# Patient Record
Sex: MALE | Race: WHITE | NOT HISPANIC OR LATINO | URBAN - METROPOLITAN AREA
[De-identification: names, ages, dates, MRNs, and addresses within clinical notes are randomized per-mention and may not be internally consistent; named-entity substitution may affect disease eponyms.]

---

## 2019-09-16 ENCOUNTER — EMERGENCY (EMERGENCY)
Facility: HOSPITAL | Age: 59
LOS: 0 days | Discharge: HOME | End: 2019-09-16
Attending: EMERGENCY MEDICINE | Admitting: EMERGENCY MEDICINE
Payer: COMMERCIAL

## 2019-09-16 VITALS
DIASTOLIC BLOOD PRESSURE: 82 MMHG | TEMPERATURE: 99 F | SYSTOLIC BLOOD PRESSURE: 131 MMHG | HEART RATE: 87 BPM | OXYGEN SATURATION: 96 % | RESPIRATION RATE: 18 BRPM

## 2019-09-16 VITALS — WEIGHT: 259.93 LBS | HEIGHT: 68 IN

## 2019-09-16 DIAGNOSIS — S61.210A LACERATION WITHOUT FOREIGN BODY OF RIGHT INDEX FINGER WITHOUT DAMAGE TO NAIL, INITIAL ENCOUNTER: ICD-10-CM

## 2019-09-16 DIAGNOSIS — Z88.0 ALLERGY STATUS TO PENICILLIN: ICD-10-CM

## 2019-09-16 DIAGNOSIS — Z23 ENCOUNTER FOR IMMUNIZATION: ICD-10-CM
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
## 2019-09-16 PROCEDURE — 99283 EMERGENCY DEPT VISIT LOW MDM: CPT

## 2019-09-16 RX ORDER — TETANUS TOXOID, REDUCED DIPHTHERIA TOXOID AND ACELLULAR PERTUSSIS VACCINE, ADSORBED 5; 2.5; 8; 8; 2.5 [IU]/.5ML; [IU]/.5ML; UG/.5ML; UG/.5ML; UG/.5ML
0.5 SUSPENSION INTRAMUSCULAR ONCE
Refills: 0 | Status: COMPLETED | OUTPATIENT
Start: 2019-09-16 | End: 2019-09-16

## 2019-09-16 RX ADMIN — TETANUS TOXOID, REDUCED DIPHTHERIA TOXOID AND ACELLULAR PERTUSSIS VACCINE, ADSORBED 0.5 MILLILITER(S): 5; 2.5; 8; 8; 2.5 SUSPENSION INTRAMUSCULAR at 12:48

## 2019-09-16 NOTE — ED PROVIDER NOTE - CARE PROVIDER_API CALL
your, primary doctor  for any new for worsening symtptoms  Phone: (   )    -  Fax: (   )    -  Follow Up Time:

## 2019-09-16 NOTE — ED PROVIDER NOTE - PHYSICAL EXAMINATION
--EXAM--  VITAL SIGNS: I have reviewed vs documented at present.  CONSTITUTIONAL: Well-developed; well-nourished; in no acute distress.   SKIN: Warm and dry, no acute rash.   right 2nd finger positive skin avulsion positive bleeding   CARD: S1, S2, Regular rate and rhythm.   RESP: No wheezes, rales or rhonchi. --EXAM--  VITAL SIGNS: I have reviewed vs documented at present.  CONSTITUTIONAL: Well-developed; well-nourished; in no acute distress.   SKIN: Warm and dry, no acute rash.   right 2nd finger positive skin avulsion positive bleeding full rom .   CARD: S1, S2, Regular rate and rhythm.   RESP: No wheezes, rales or rhonchi.

## 2019-09-16 NOTE — ED PROVIDER NOTE - CONDITION AT DISCHARGE:
[id number] [place] [address] with [facility]  4/12/2017    2:12 PM    Cc:  Episodes of dizziness    2005 had first spell of vertigo.   She woke up one morning who felt staggering gait, room spinning and left eye kept bryan hyperlipidemia 9/20/2010   • Asthma    • Diverticulosis    • Cholelithiasis          Past Surgical History    COLONOSCOPY & POLYPECTOMY      CHOLECYSTECTOMY      OTHER SURGICAL HISTORY      Comment orthopedic surgery    LIGATION HEMORRHOID W/US      OTHER the evening as directed., Disp: 17 pen, Rfl: 3  •  NOVOLOG FLEXPEN 100 UNIT/ML Subcutaneous Solution Pen-injector, INJECT 14 TO 17 UNITS AT BREAKFAST, 14 TO 17 UNITS AT LUNCH, 12 TO 17 UNITS AT DINNER AND 23 UNITS FOR SNACKS, Disp: 75 mL, Rfl: 6  •  ONETOU dehiscence      Physical Therapy Referral - External  CT TEMPORAL BONES (CPT=70480)  MRA BRAIN (NRX=91650)      Defer discussion of treatment pending above findings      Deitra Gosselin MD  Vascular & General Neurology  Director, Multiple Sclerosis Program Improved

## 2019-09-16 NOTE — ED PROVIDER NOTE - PROVIDER TOKENS
FREE:[LAST:[your],FIRST:[primary doctor],PHONE:[(   )    -],FAX:[(   )    -],ADDRESS:[for any new for worsening symtptoms]]

## 2019-09-16 NOTE — ED PROVIDER NOTE - CLINICAL SUMMARY MEDICAL DECISION MAKING FREE TEXT BOX
pt with avulsion to end of finger using slicer.  no other complaints.  no other injuries.  no numbness, no weakness.  pt given tetanus.  Gel foam applied.  hemostasis achieved.  motor/sensation intact.  pt dc with outpatient follow up.  pt given gel foam dc instructions

## 2019-09-16 NOTE — ED PROVIDER NOTE - NSFOLLOWUPINSTRUCTIONS_ED_ALL_ED_FT
Laceration    A laceration is a cut that goes through all of the layers of the skin and into the tissue that is right under the skin. Some lacerations heal on their own. . Proper laceration care minimizes the risk of infection and helps the laceration to heal better.  gelfoam was placed to stop bleeding     SEEK IMMEDIATE MEDICAL CARE IF YOU HAVE ANY OF THE FOLLOWING SYMPTOMS: swelling around the wound, worsening pain, drainage from the wound, red streaking going away from your wound, inability to move finger or toe near the laceration, or discoloration of skin near the laceration.

## 2019-09-16 NOTE — ED PROVIDER NOTE - OBJECTIVE STATEMENT
this is 58 yo male presents to ed for evaluation of laceration of right 2nd finger. patient states he was using food slicer .

## 2019-09-16 NOTE — ED PROVIDER NOTE - PATIENT PORTAL LINK FT
You can access the FollowMyHealth Patient Portal offered by [facility] by registering at the following website: [email or web address]. By joining [facility]’s FollowMyHealth portal, you will also be able to view your health information using other applications (apps) compatible with our system.

## 2019-09-16 NOTE — ED PROVIDER NOTE - ATTENDING CONTRIBUTION TO CARE
58 yo m presents with right 2nd finger skin avulsion.  pt accidentally sliced the palmar distal aspect of fingerpad.  no other complaints.  no other injuries.  awake, alert.  RUE: 2nd digit with skin avulsion, mild bleeding.  rom of finger intact, flexion/extension intact.  neurovascular intact.    p: tetanus.  gel foam applied to achieve hemostasis and dressing placed.

## 2019-09-16 NOTE — ED PROVIDER NOTE - NS ED ROS FT
Review of Systems:  	•	CONSTITUTIONAL - no fever, no diaphoresis, no chills  	•	SKIN - right 2nd finger avulsion   	•	HEMATOLOGIC - positive bleeding to finger   	•  	•	RESPIRATORY - no shortness of breath, no cough  	•	CARDIAC - no chest pain, no palpitations

## 2020-03-14 ENCOUNTER — INPATIENT (INPATIENT)
Facility: HOSPITAL | Age: 60
LOS: 0 days | Discharge: HOME | End: 2020-03-15
Attending: PLASTIC SURGERY | Admitting: PLASTIC SURGERY
Payer: COMMERCIAL

## 2020-03-14 VITALS
HEART RATE: 76 BPM | TEMPERATURE: 97 F | RESPIRATION RATE: 18 BRPM | HEIGHT: 68 IN | DIASTOLIC BLOOD PRESSURE: 85 MMHG | SYSTOLIC BLOOD PRESSURE: 127 MMHG | WEIGHT: 265 LBS | OXYGEN SATURATION: 99 %

## 2020-03-14 DIAGNOSIS — E11.69 TYPE 2 DIABETES MELLITUS WITH OTHER SPECIFIED COMPLICATION: ICD-10-CM

## 2020-03-14 DIAGNOSIS — Z98.890 OTHER SPECIFIED POSTPROCEDURAL STATES: Chronic | ICD-10-CM

## 2020-03-14 DIAGNOSIS — S61.318S: ICD-10-CM

## 2020-03-14 LAB
ALBUMIN SERPL ELPH-MCNC: 4.1 G/DL — SIGNIFICANT CHANGE UP (ref 3.5–5.2)
ALP SERPL-CCNC: 47 U/L — SIGNIFICANT CHANGE UP (ref 30–115)
ALT FLD-CCNC: 16 U/L — SIGNIFICANT CHANGE UP (ref 0–41)
ANION GAP SERPL CALC-SCNC: 13 MMOL/L — SIGNIFICANT CHANGE UP (ref 7–14)
APTT BLD: 25.3 SEC — LOW (ref 27–39.2)
AST SERPL-CCNC: 33 U/L — SIGNIFICANT CHANGE UP (ref 0–41)
BASOPHILS # BLD AUTO: 0.05 K/UL — SIGNIFICANT CHANGE UP (ref 0–0.2)
BASOPHILS NFR BLD AUTO: 0.5 % — SIGNIFICANT CHANGE UP (ref 0–1)
BILIRUB SERPL-MCNC: 0.3 MG/DL — SIGNIFICANT CHANGE UP (ref 0.2–1.2)
BUN SERPL-MCNC: 16 MG/DL — SIGNIFICANT CHANGE UP (ref 10–20)
CALCIUM SERPL-MCNC: 9.1 MG/DL — SIGNIFICANT CHANGE UP (ref 8.5–10.1)
CHLORIDE SERPL-SCNC: 97 MMOL/L — LOW (ref 98–110)
CO2 SERPL-SCNC: 21 MMOL/L — SIGNIFICANT CHANGE UP (ref 17–32)
CREAT SERPL-MCNC: 0.9 MG/DL — SIGNIFICANT CHANGE UP (ref 0.7–1.5)
EOSINOPHIL # BLD AUTO: 0.42 K/UL — SIGNIFICANT CHANGE UP (ref 0–0.7)
EOSINOPHIL NFR BLD AUTO: 3.9 % — SIGNIFICANT CHANGE UP (ref 0–8)
GLUCOSE BLDC GLUCOMTR-MCNC: 307 MG/DL — HIGH (ref 70–99)
GLUCOSE SERPL-MCNC: 347 MG/DL — HIGH (ref 70–99)
HCT VFR BLD CALC: 39.4 % — LOW (ref 42–52)
HGB BLD-MCNC: 12.5 G/DL — LOW (ref 14–18)
IMM GRANULOCYTES NFR BLD AUTO: 0.7 % — HIGH (ref 0.1–0.3)
INR BLD: 0.94 RATIO — SIGNIFICANT CHANGE UP (ref 0.65–1.3)
LYMPHOCYTES # BLD AUTO: 28 % — SIGNIFICANT CHANGE UP (ref 20.5–51.1)
LYMPHOCYTES # BLD AUTO: 3 K/UL — SIGNIFICANT CHANGE UP (ref 1.2–3.4)
MCHC RBC-ENTMCNC: 23.9 PG — LOW (ref 27–31)
MCHC RBC-ENTMCNC: 31.7 G/DL — LOW (ref 32–37)
MCV RBC AUTO: 75.3 FL — LOW (ref 80–94)
MONOCYTES # BLD AUTO: 0.8 K/UL — HIGH (ref 0.1–0.6)
MONOCYTES NFR BLD AUTO: 7.5 % — SIGNIFICANT CHANGE UP (ref 1.7–9.3)
NEUTROPHILS # BLD AUTO: 6.38 K/UL — SIGNIFICANT CHANGE UP (ref 1.4–6.5)
NEUTROPHILS NFR BLD AUTO: 59.4 % — SIGNIFICANT CHANGE UP (ref 42.2–75.2)
NRBC # BLD: 0 /100 WBCS — SIGNIFICANT CHANGE UP (ref 0–0)
PLATELET # BLD AUTO: 240 K/UL — SIGNIFICANT CHANGE UP (ref 130–400)
POTASSIUM SERPL-MCNC: 5.5 MMOL/L — HIGH (ref 3.5–5)
POTASSIUM SERPL-SCNC: 5.5 MMOL/L — HIGH (ref 3.5–5)
PROT SERPL-MCNC: 6.4 G/DL — SIGNIFICANT CHANGE UP (ref 6–8)
PROTHROM AB SERPL-ACNC: 10.8 SEC — SIGNIFICANT CHANGE UP (ref 9.95–12.87)
RBC # BLD: 5.23 M/UL — SIGNIFICANT CHANGE UP (ref 4.7–6.1)
RBC # FLD: 14.6 % — HIGH (ref 11.5–14.5)
SODIUM SERPL-SCNC: 131 MMOL/L — LOW (ref 135–146)
WBC # BLD: 10.72 K/UL — SIGNIFICANT CHANGE UP (ref 4.8–10.8)
WBC # FLD AUTO: 10.72 K/UL — SIGNIFICANT CHANGE UP (ref 4.8–10.8)

## 2020-03-14 PROCEDURE — 99285 EMERGENCY DEPT VISIT HI MDM: CPT

## 2020-03-14 PROCEDURE — 73130 X-RAY EXAM OF HAND: CPT | Mod: 26,RT

## 2020-03-14 RX ORDER — DEXTROSE 50 % IN WATER 50 %
12.5 SYRINGE (ML) INTRAVENOUS ONCE
Refills: 0 | Status: DISCONTINUED | OUTPATIENT
Start: 2020-03-14 | End: 2020-03-15

## 2020-03-14 RX ORDER — DEXTROSE 50 % IN WATER 50 %
25 SYRINGE (ML) INTRAVENOUS ONCE
Refills: 0 | Status: DISCONTINUED | OUTPATIENT
Start: 2020-03-14 | End: 2020-03-15

## 2020-03-14 RX ORDER — DEXTROSE 50 % IN WATER 50 %
15 SYRINGE (ML) INTRAVENOUS ONCE
Refills: 0 | Status: DISCONTINUED | OUTPATIENT
Start: 2020-03-14 | End: 2020-03-15

## 2020-03-14 RX ORDER — GENTAMICIN SULFATE 40 MG/ML
80 VIAL (ML) INJECTION EVERY 8 HOURS
Refills: 0 | Status: DISCONTINUED | OUTPATIENT
Start: 2020-03-14 | End: 2020-03-15

## 2020-03-14 RX ORDER — SITAGLIPTIN AND METFORMIN HYDROCHLORIDE 500; 50 MG/1; MG/1
1 TABLET, FILM COATED ORAL
Qty: 0 | Refills: 0 | DISCHARGE

## 2020-03-14 RX ORDER — MORPHINE SULFATE 50 MG/1
4 CAPSULE, EXTENDED RELEASE ORAL
Refills: 0 | Status: DISCONTINUED | OUTPATIENT
Start: 2020-03-14 | End: 2020-03-15

## 2020-03-14 RX ORDER — SODIUM CHLORIDE 9 MG/ML
1000 INJECTION INTRAMUSCULAR; INTRAVENOUS; SUBCUTANEOUS
Refills: 0 | Status: DISCONTINUED | OUTPATIENT
Start: 2020-03-14 | End: 2020-03-15

## 2020-03-14 RX ORDER — CEFAZOLIN SODIUM 1 G
2000 VIAL (EA) INJECTION ONCE
Refills: 0 | Status: COMPLETED | OUTPATIENT
Start: 2020-03-14 | End: 2020-03-14

## 2020-03-14 RX ORDER — INSULIN LISPRO 100/ML
VIAL (ML) SUBCUTANEOUS
Refills: 0 | Status: DISCONTINUED | OUTPATIENT
Start: 2020-03-14 | End: 2020-03-15

## 2020-03-14 RX ORDER — VANCOMYCIN HCL 1 G
1000 VIAL (EA) INTRAVENOUS EVERY 12 HOURS
Refills: 0 | Status: COMPLETED | OUTPATIENT
Start: 2020-03-14 | End: 2020-03-15

## 2020-03-14 RX ORDER — GENTAMICIN SULFATE 40 MG/ML
80 VIAL (ML) INJECTION ONCE
Refills: 0 | Status: COMPLETED | OUTPATIENT
Start: 2020-03-14 | End: 2020-03-14

## 2020-03-14 RX ORDER — SODIUM CHLORIDE 9 MG/ML
1000 INJECTION, SOLUTION INTRAVENOUS ONCE
Refills: 0 | Status: COMPLETED | OUTPATIENT
Start: 2020-03-14 | End: 2020-03-14

## 2020-03-14 RX ORDER — GLUCAGON INJECTION, SOLUTION 0.5 MG/.1ML
1 INJECTION, SOLUTION SUBCUTANEOUS ONCE
Refills: 0 | Status: DISCONTINUED | OUTPATIENT
Start: 2020-03-14 | End: 2020-03-15

## 2020-03-14 RX ORDER — INSULIN HUMAN 100 [IU]/ML
6 INJECTION, SOLUTION SUBCUTANEOUS ONCE
Refills: 0 | Status: DISCONTINUED | OUTPATIENT
Start: 2020-03-14 | End: 2020-03-15

## 2020-03-14 RX ORDER — ACETAMINOPHEN 500 MG
650 TABLET ORAL EVERY 6 HOURS
Refills: 0 | Status: DISCONTINUED | OUTPATIENT
Start: 2020-03-14 | End: 2020-03-15

## 2020-03-14 RX ORDER — INFLUENZA VIRUS VACCINE 15; 15; 15; 15 UG/.5ML; UG/.5ML; UG/.5ML; UG/.5ML
0.5 SUSPENSION INTRAMUSCULAR ONCE
Refills: 0 | Status: COMPLETED | OUTPATIENT
Start: 2020-03-14 | End: 2020-03-14

## 2020-03-14 RX ORDER — CHLORHEXIDINE GLUCONATE 213 G/1000ML
1 SOLUTION TOPICAL
Refills: 0 | Status: DISCONTINUED | OUTPATIENT
Start: 2020-03-14 | End: 2020-03-15

## 2020-03-14 RX ORDER — SODIUM CHLORIDE 9 MG/ML
1000 INJECTION, SOLUTION INTRAVENOUS
Refills: 0 | Status: DISCONTINUED | OUTPATIENT
Start: 2020-03-14 | End: 2020-03-15

## 2020-03-14 RX ORDER — MORPHINE SULFATE 50 MG/1
2 CAPSULE, EXTENDED RELEASE ORAL
Refills: 0 | Status: DISCONTINUED | OUTPATIENT
Start: 2020-03-14 | End: 2020-03-15

## 2020-03-14 RX ADMIN — Medication 250 MILLIGRAM(S): at 21:46

## 2020-03-14 RX ADMIN — Medication 8: at 21:45

## 2020-03-14 RX ADMIN — Medication 160 MILLIGRAM(S): at 19:05

## 2020-03-14 RX ADMIN — Medication 100 MILLIGRAM(S): at 18:48

## 2020-03-14 RX ADMIN — SODIUM CHLORIDE 1000 MILLILITER(S): 9 INJECTION, SOLUTION INTRAVENOUS at 18:47

## 2020-03-14 NOTE — H&P ADULT - NSHPLABSRESULTS_GEN_ALL_CORE
12.5   10.72 )-----------( 240      ( 14 Mar 2020 18:17 )             39.4       03-14    131<L>  |  97<L>  |  16  ----------------------------<  347<H>  5.5<H>   |  21  |  0.9    Ca    9.1      14 Mar 2020 18:17    TPro  6.4  /  Alb  4.1  /  TBili  0.3  /  DBili  x   /  AST  33  /  ALT  16  /  AlkPhos  47  03-14              PT/INR - ( 14 Mar 2020 18:17 )   PT: 10.80 sec;   INR: 0.94 ratio      PTT - ( 14 Mar 2020 18:17 )  PTT:25.3 sec        POCT Blood Glucose.: 339 mg/dL (14 Mar 2020 18:22)        Xray right hand/fingers:  Ordered by ER and not yet completed.

## 2020-03-14 NOTE — ED PROVIDER NOTE - PHYSICAL EXAMINATION
CONST: NAD  EYES: Sclera and conjunctiva clear.   ENT: No nasal discharge. Oropharynx normal appearing, no erythema or exudates. No abscess or swelling. Uvula midline.   NECK: Non-tender, no meningeal signs. normal ROM. supple   CARD: S1 S2; No jvd  RESP: Equal BS B/L, No wheezes, rhonchi or rales. No distress  GI: Soft, non-tender, non-distended. no cva tenderness. normal BS  MS: Normal ROM in all extremities.   SKIN: Complex laceration to R 2nd and 3rd finger  NEURO: A&Ox4, No focal deficits.

## 2020-03-14 NOTE — PATIENT PROFILE ADULT - DO YOU FEEL UNSAFE AT SCHOOL?
GROUP THERAPY PROGRESS NOTE    Jagruti Harding is participating in [place]. Group time: 15 minutes    Personal goal for participation: Community Announcement    Goal orientation: community    Group therapy participation: active    Therapeutic interventions reviewed and discussed: Discussed unit schedule, visiting hours, housekeeping/maintenance issues, addressed program concerns and unit guidelines. Impression of participation: Pt had issues at snack time this evening;did not appreciate the fact that this writer had to establish order. Pt was informed to step back while this writer was preparing snacks. Informed Pt that we have to follow structure in order to avoid chaos, confusion, and to ensure everyone gets their fair share of snacks. Pt did not agree and decided to leave the conversation and go to his room. not applicable

## 2020-03-14 NOTE — H&P ADULT - HISTORY OF PRESENT ILLNESS
Patient is a 60 y/o male who presents to the ER with complex laceration of right 2nd & 3rd digits that he sustained while working on his table saw earlier today.  Patient reports he had moderate bleeding that slowed when applied pressure.    Patient reports moderate pain and decreased ROM but denies paresthesias or numbness to fingers.    Patient denies other injuries or LOC.

## 2020-03-14 NOTE — ED PROVIDER NOTE - SECONDARY DIAGNOSIS.
Laceration of right middle finger without foreign body with damage to nail, initial encounter Type 2 diabetes mellitus with other specified complication, with long-term current use of insulin

## 2020-03-14 NOTE — ED PROVIDER NOTE - ATTENDING CONTRIBUTION TO CARE
I personally evaluated patient. I agree with the findings and plan with all documentation on chart except as documented  in my note.    60 y/o male who presents to the ER with complex laceration of right 2nd & 3rd digits that he sustained while working on his table saw earlier today.  Patient reports he had moderate bleeding that slowed when applied pressure.  Patient has a 3rd finger deformity with a very complex laceration to his dominant right hand.    Patient reports moderate pain and decreased ROM but denies paresthesias or numbness to fingers.    Patient denies other injuries or LOC.      Tdap up-to-date.  Dr. Pulliam immediately consulted and was sent pictures (at permission of patient).  Patient given dual IV antibiotics and labs sent.  Patient given fluid and insulin for high sugar. X-rays done.  Will admit to Dr. Pulliam for operative fixation of this laceration. Patient and family spoken to in detail about results and plan of care.  All questions addressed.  Results of ED work up discussed and patient/family given a copy of the results. They are aware patient requires admission for further treatment and work up. I personally evaluated patient. I agree with the findings and plan with all documentation on chart except as documented  in my note.    60 y/o male who presents to the ER with complex laceration of right 2nd & 3rd digits that he sustained while working on his table saw earlier today.  Patient reports he had moderate bleeding that slowed when applied pressure.  Patient has a 3rd finger deformity with a very complex laceration to his dominant right hand.    Patient reports moderate pain and decreased ROM but denies paresthesias or numbness to fingers.    Patient denies other injuries or LOC.  Hyperglycemia and fluids given and insulin.    Tdap up-to-date.  Dr. Pulliam immediately consulted and was sent pictures (at permission of patient).  Patient given dual IV antibiotics and labs sent.  Patient given fluid and insulin for high sugar. X-rays done.  Will admit to Dr. Pulliam for operative fixation of this laceration. Patient and family spoken to in detail about results and plan of care.  All questions addressed.  Results of ED work up discussed and patient/family given a copy of the results. They are aware patient requires admission for further treatment and work up.

## 2020-03-14 NOTE — PATIENT PROFILE ADULT - PRIMARY SOURCE OF SUPPORT/COMFORT
Impression;  1. Dysphagia- 2/2 food impaction which has likely passed  2. Afib?- just on ASA  3. HTn    Plan:  1. Recommend barium swallow this AM. Patient wishes to avoid anesthesia and endoscopy if it shows no evidence of obstruction  2. keep NPO until barium swallow   3. Cardiology clearance in case EGD is warranted Impression;  1. Dysphagia- 2/2 food impaction which has likely passed; differential also includes acid reflux related dysphagia, strictures, rings, neoplasms, less likely EoE  2. Afib?- just on ASA  3. HTN    Plan:  1. Recommend barium swallow this AM. Patient wishes to avoid anesthesia and endoscopy if it shows no evidence of obstruction  2. keep NPO until barium swallow   3. Cardiology clearance in case EGD is warranted  4. PPI daily spouse

## 2020-03-14 NOTE — H&P ADULT - PROBLEM SELECTOR PLAN 1
.  - Case d/w Dr. Pulliam and states to admit patient to his service for exploration of right 2nd & 3rd digits and possible repair of tendon/ligaments and closure of lacerations tomorrow 3/15/2020.   - Follow up right hand Xray. (Ordered by ER).  - Dr. Pulliam wants Ivabx with Ancef 1 gram IV [id number] and Gent. 80 mg IV  [id number].  (First doses already given in ER).   - Patient will be NPO after midnight for surgery in am.   - Dr. Pulliam's plan discussed with ER attending.  - Also, plan d/w patient and he agrees with Dr. Pulliam's plan. .  - Case d/w Dr. Pulliam and states to admit patient to his service for exploration of right 2nd & 3rd digits and possible repair of tendon/ligaments and closure of lacerations tomorrow 3/15/2020.   - Follow up right hand Xray. (Ordered by ER).  - Dr. Pulliam wants Ivabx with Ancef [id number] (But Pt is PCN allergic so will order Vanco 1 gram q12 hours) and Gent. 80 mg IV  [id number].  (First doses already given in ER).   - Patient will be NPO after midnight for surgery in am.   - Dr. Pulliam's plan discussed with ER attending.  - Also, plan d/w patient and he agrees with Dr. Pulliam's plan.

## 2020-03-14 NOTE — ED PROVIDER NOTE - CARE PLAN
Principal Discharge DX:	Finger laceration Principal Discharge DX:	Open fracture of multiple sites of phalanx or phalanges of hand, initial encounter  Secondary Diagnosis:	Laceration of right middle finger without foreign body with damage to nail, initial encounter  Secondary Diagnosis:	Type 2 diabetes mellitus with other specified complication, with long-term current use of insulin

## 2020-03-14 NOTE — ED PROVIDER NOTE - OBJECTIVE STATEMENT
59y M pmh dm presents for eval of R hand injury. Pt states his R hand was cut by a table saw this afternoon, now presents with severe sharp pain to R 2nd and 3rd finger, aggravated with rom, no relieving factors.

## 2020-03-14 NOTE — H&P ADULT - PROBLEM SELECTOR PLAN 2
.  - Dr. Pulliam states to hold routine diabetes medications and place on a Sliding scale of Insulin.  - NPO after midnight.

## 2020-03-14 NOTE — ED PROVIDER NOTE - CLINICAL SUMMARY MEDICAL DECISION MAKING FREE TEXT BOX
60 y/o male who presents to the ER with complex laceration of right 2nd & 3rd digits that he sustained while working on his table saw earlier today.  Patient reports he had moderate bleeding that slowed when applied pressure.  Patient has a 3rd finger deformity with a very complex laceration to his dominant right hand.    Patient reports moderate pain and decreased ROM but denies paresthesias or numbness to fingers.    Patient denies other injuries or LOC.      Tdap up-to-date.  Dr. Pulliam immediately consulted and was sent pictures (at permission of patient).  Patient given dual IV antibiotics and labs sent.  Patient given fluid and insulin for high sugar. X-rays done.  Will admit to Dr. Pulliam for operative fixation of this laceration. Patient and family spoken to in detail about results and plan of care.  All questions addressed.  Results of ED work up discussed and patient/family given a copy of the results. They are aware patient requires admission for further treatment and work up.

## 2020-03-14 NOTE — H&P ADULT - NSHPPHYSICALEXAM_GEN_ALL_CORE
PHYSICAL EXAM:    General:  WD, obese male, conversant in NAD.    Eyes: PERRLA, EOM intact.    Neck: no tenderness, no JVD.    Back: no spinal tenderness.     Respiratory: CTA B/L.    Cardiovascular:  S1 & S2, RRR.    Gastrointestinal: + BS, soft, obese, protuberant, no distention, non-tender, no rebound or guarding or peritoneal signs.    Extremities:   Right hand:  Dressing in place to right 2nd and 3rd digits with sanguinous discharge noted on gauze.  Partial fingertip amputation noted to 2nd digit with oozing and bleeding noted (not arterial).  Longitudinal laceration to lateral 3rd digit with oozing & bleeding noted (not arterial).  Patient can slightly wiggle fingers with pain on movement.  Decreased sensation noted to 2nd & 3rd digits.    Remaining extremities with Free painless ROM, no C/C/E, no calf tenderness.

## 2020-03-14 NOTE — ED PROVIDER NOTE - PRINCIPAL DIAGNOSIS
Finger laceration Open fracture of multiple sites of phalanx or phalanges of hand, initial encounter

## 2020-03-14 NOTE — H&P ADULT - ASSESSMENT
58 y/o male with complex laceration of right 2nd & 3rd digits that he sustained while working on his table saw earlier today.

## 2020-03-14 NOTE — H&P ADULT - PROBLEM SELECTOR PROBLEM 1
Laceration of middle finger with damage to nail, foreign body presence unspecified, unspecified laterality, sequela

## 2020-03-14 NOTE — ED PROVIDER NOTE - NS ED ROS FT
Constitutional: (-) fever  Eyes/ENT: (-) blurry vision, (-) epistaxis  Cardiovascular: (-) chest pain, (-) syncope  Respiratory: (-) cough, (-) shortness of breath  Gastrointestinal: (-) vomiting, (-) diarrhea  : (-) hematuria  Musculoskeletal: (+) joint pain, (-) neck pain, (-) back pain  Integumentary: (+) laceration  Neurological: (-) headache, (-) altered mental status  Allergic/Immunologic: (-) pruritus

## 2020-03-15 ENCOUNTER — TRANSCRIPTION ENCOUNTER (OUTPATIENT)
Age: 60
End: 2020-03-15

## 2020-03-15 VITALS
SYSTOLIC BLOOD PRESSURE: 118 MMHG | HEART RATE: 78 BPM | RESPIRATION RATE: 16 BRPM | TEMPERATURE: 98 F | DIASTOLIC BLOOD PRESSURE: 64 MMHG

## 2020-03-15 PROBLEM — E11.9 TYPE 2 DIABETES MELLITUS WITHOUT COMPLICATIONS: Chronic | Status: ACTIVE | Noted: 2019-09-16

## 2020-03-15 LAB
ANION GAP SERPL CALC-SCNC: 7 MMOL/L — SIGNIFICANT CHANGE UP (ref 7–14)
BUN SERPL-MCNC: 13 MG/DL — SIGNIFICANT CHANGE UP (ref 10–20)
CALCIUM SERPL-MCNC: 8.9 MG/DL — SIGNIFICANT CHANGE UP (ref 8.5–10.1)
CHLORIDE SERPL-SCNC: 102 MMOL/L — SIGNIFICANT CHANGE UP (ref 98–110)
CO2 SERPL-SCNC: 31 MMOL/L — SIGNIFICANT CHANGE UP (ref 17–32)
CREAT SERPL-MCNC: 0.8 MG/DL — SIGNIFICANT CHANGE UP (ref 0.7–1.5)
GLUCOSE BLDC GLUCOMTR-MCNC: 154 MG/DL — HIGH (ref 70–99)
GLUCOSE BLDC GLUCOMTR-MCNC: 293 MG/DL — HIGH (ref 70–99)
GLUCOSE SERPL-MCNC: 187 MG/DL — HIGH (ref 70–99)
POTASSIUM SERPL-MCNC: 4.4 MMOL/L — SIGNIFICANT CHANGE UP (ref 3.5–5)
POTASSIUM SERPL-SCNC: 4.4 MMOL/L — SIGNIFICANT CHANGE UP (ref 3.5–5)
SODIUM SERPL-SCNC: 140 MMOL/L — SIGNIFICANT CHANGE UP (ref 135–146)

## 2020-03-15 RX ORDER — ACETAMINOPHEN 500 MG
2 TABLET ORAL
Qty: 0 | Refills: 0 | DISCHARGE
Start: 2020-03-15

## 2020-03-15 RX ORDER — OXYCODONE AND ACETAMINOPHEN 5; 325 MG/1; MG/1
1 TABLET ORAL ONCE
Refills: 0 | Status: DISCONTINUED | OUTPATIENT
Start: 2020-03-15 | End: 2020-03-15

## 2020-03-15 RX ORDER — SODIUM CHLORIDE 9 MG/ML
1000 INJECTION, SOLUTION INTRAVENOUS
Refills: 0 | Status: DISCONTINUED | OUTPATIENT
Start: 2020-03-15 | End: 2020-03-15

## 2020-03-15 RX ORDER — MORPHINE SULFATE 50 MG/1
4 CAPSULE, EXTENDED RELEASE ORAL
Refills: 0 | Status: DISCONTINUED | OUTPATIENT
Start: 2020-03-15 | End: 2020-03-15

## 2020-03-15 RX ORDER — ONDANSETRON 8 MG/1
4 TABLET, FILM COATED ORAL EVERY 4 HOURS
Refills: 0 | Status: DISCONTINUED | OUTPATIENT
Start: 2020-03-15 | End: 2020-03-15

## 2020-03-15 RX ORDER — ACETAMINOPHEN 500 MG
650 TABLET ORAL ONCE
Refills: 0 | Status: DISCONTINUED | OUTPATIENT
Start: 2020-03-15 | End: 2020-03-15

## 2020-03-15 RX ORDER — IBUPROFEN 200 MG
1 TABLET ORAL
Qty: 0 | Refills: 0 | DISCHARGE

## 2020-03-15 RX ADMIN — SODIUM CHLORIDE 100 MILLILITER(S): 9 INJECTION, SOLUTION INTRAVENOUS at 10:01

## 2020-03-15 RX ADMIN — Medication 6: at 06:06

## 2020-03-15 RX ADMIN — Medication 100 MILLIGRAM(S): at 05:46

## 2020-03-15 RX ADMIN — Medication 100 MILLIGRAM(S): at 00:04

## 2020-03-15 NOTE — DISCHARGE NOTE PROVIDER - NSDCMRMEDTOKEN_GEN_ALL_CORE_FT
acetaminophen 325 mg oral tablet: 2 tab(s) orally every 4 hours, As Needed - 3)  clindamycin 300 mg oral capsule: 1 cap(s) orally 4 times a day   ibuprofen 400 mg oral tablet: 1 tab(s) orally every 6 hours, As Needed for moderate-severe pain  insulin: subcutaneous once a day  Janumet 50 mg-500 mg oral tablet: 1 tab(s) orally 2 times a day

## 2020-03-15 NOTE — DISCHARGE NOTE NURSING/CASE MANAGEMENT/SOCIAL WORK - PATIENT PORTAL LINK FT
You can access the FollowMyHealth Patient Portal offered by [facility] by registering at the following website: [email or web address]. By joining [employer] [facility]’s FollowMyHealth portal, you will also be able to view your health information using other applications (apps) compatible with our system.

## 2020-03-15 NOTE — DISCHARGE NOTE PROVIDER - CARE PROVIDER_API CALL
Sergei Pulliam (DO)  Plastic Surgery  [address] [place] [address]  Phone: [phone]  Fax: [phone]  Follow Up Time:

## 2020-03-15 NOTE — CHART NOTE - NSCHARTNOTEFT_GEN_A_CORE
Patient seen and examined at bedside post operatively.  Resting comfortably.  Wants to be discharged home.  D/W Surgery attending, ok to discharge.  APAP/IBU PRN for pain.  Send Rx Clinda 300mg QID x 7 days.  Call office tomorrow for appointment on Thursday.  Above d/w patient and verbalized understanding.

## 2020-03-15 NOTE — DISCHARGE NOTE PROVIDER - NSDCCPCAREPLAN_GEN_ALL_CORE_FT
PRINCIPAL DISCHARGE DIAGNOSIS  Diagnosis: Open fracture of multiple sites of phalanx or phalanges of hand, initial encounter  Assessment and Plan of Treatment: You had surgery on your hand.  Call Dr. Pulliam's office tomorrow to make an appointment for Thursday.  You were prescribed antibiotics.  Be sure to take them as written on the bottle.  Use tylenol and Ibuprofen for pain.  If fever, severe pain then call the office or return to ED for evaluation.      SECONDARY DISCHARGE DIAGNOSES  Diagnosis: Type 2 diabetes mellitus with other specified complication, with long-term current use of insulin  Assessment and Plan of Treatment: Type 2 diabetes mellitus with other specified complication, with long-term current use of insulin    Diagnosis: Laceration of right middle finger without foreign body with damage to nail, initial encounter  Assessment and Plan of Treatment:

## 2020-03-19 DIAGNOSIS — Z79.84 LONG TERM (CURRENT) USE OF ORAL HYPOGLYCEMIC DRUGS: ICD-10-CM

## 2020-03-19 DIAGNOSIS — Z79.4 LONG TERM (CURRENT) USE OF INSULIN: ICD-10-CM

## 2020-03-19 DIAGNOSIS — Z47.89 ENCOUNTER FOR OTHER ORTHOPEDIC AFTERCARE: ICD-10-CM

## 2020-03-19 DIAGNOSIS — E66.9 OBESITY, UNSPECIFIED: ICD-10-CM

## 2020-03-19 DIAGNOSIS — E11.69 TYPE 2 DIABETES MELLITUS WITH OTHER SPECIFIED COMPLICATION: ICD-10-CM

## 2020-03-19 DIAGNOSIS — Z88.0 ALLERGY STATUS TO PENICILLIN: ICD-10-CM

## 2021-06-28 NOTE — ED ADULT NURSE NOTE - PMH
Quality 130: Documentation Of Current Medications In The Medical Record: Current Medications Documented Quality 431: Preventive Care And Screening: Unhealthy Alcohol Use - Screening: Patient screened for unhealthy alcohol use using a single question and scores less than 2 times per year Quality 226: Preventive Care And Screening: Tobacco Use: Screening And Cessation Intervention: Tobacco Screening not Performed for Unknown Reasons Detail Level: Detailed Diabetes

## 2022-01-20 NOTE — PATIENT PROFILE ADULT - NSPROMEDSPUMP_GEN_A_NUR
Dr. Nur's protocol labs ordered.    
Silke Stout Cornelio Montoya Patient Age: 49 year old  MESSAGE: Interpreting service used: No      IM/FP- Orders- Order Request-      Type of order being requested: Labs-        Type of lab:  Annual lab    Reason order is needed: Annual Complete Exam     Appointment on 5/14    Is the patient currently having any symptoms? No- Route message to provider's clinical pool.             ALLERGIES:  Patient has no known allergies.  Current Outpatient Medications   Medication   • HYDROcodone-acetaminophen (Norco) 5-325 MG per tablet   • atorvastatin (LIPITOR) 10 MG tablet   • amLODIPine (NORVASC) 5 MG tablet   • lisinopril (ZESTRIL) 10 MG tablet     No current facility-administered medications for this visit.     Facility-Administered Medications Ordered in Other Visits   Medication   • lidocaine 1 % injection 100 mg     PHARMACY to use:           Pharmacy preference(s) on file:   [id number] [place], [place] - [address] [place] [address] AT [facility] & [address] [address]  Phone: [phone] Fax: [phone]      CALL BACK INFO: Ok to leave response (including medical information) with family member or on answering machine      PCP: Pao Petit MD         INS: Payor: [employer]UE [employer]UE [employer]ELD [place] / Plan: PPO [id number] / Product Type: PPO MISC   PATIENT ADDRESS:  [address] [place] [id number]      
none

## 2023-05-31 PROBLEM — E66.9 OBESITY, UNSPECIFIED: Chronic | Status: ACTIVE | Noted: 2020-03-14

## 2023-11-16 PROBLEM — Z00.00 ENCOUNTER FOR PREVENTIVE HEALTH EXAMINATION: Status: ACTIVE | Noted: 2023-11-16

## 2023-11-27 ENCOUNTER — APPOINTMENT (OUTPATIENT)
Dept: ENDOCRINOLOGY | Facility: CLINIC | Age: 63
End: 2023-11-27
Payer: COMMERCIAL

## 2023-11-27 VITALS
HEIGHT: 68 IN | BODY MASS INDEX: 42.13 KG/M2 | SYSTOLIC BLOOD PRESSURE: 130 MMHG | WEIGHT: 278 LBS | OXYGEN SATURATION: 98 % | DIASTOLIC BLOOD PRESSURE: 78 MMHG | HEART RATE: 80 BPM

## 2023-11-27 DIAGNOSIS — Z78.9 OTHER SPECIFIED HEALTH STATUS: ICD-10-CM

## 2023-11-27 DIAGNOSIS — Z82.49 FAMILY HISTORY OF ISCHEMIC HEART DISEASE AND OTHER DISEASES OF THE CIRCULATORY SYSTEM: ICD-10-CM

## 2023-11-27 DIAGNOSIS — Z82.3 FAMILY HISTORY OF STROKE: ICD-10-CM

## 2023-11-27 DIAGNOSIS — Z83.3 FAMILY HISTORY OF DIABETES MELLITUS: ICD-10-CM

## 2023-11-27 PROCEDURE — 99205 OFFICE O/P NEW HI 60 MIN: CPT

## 2023-11-27 RX ORDER — EZETIMIBE 10 MG/1
10 TABLET ORAL
Refills: 0 | Status: ACTIVE | COMMUNITY

## 2023-11-27 RX ORDER — BEMPEDOIC ACID 180 MG/1
180 TABLET, FILM COATED ORAL
Refills: 0 | Status: ACTIVE | COMMUNITY

## 2023-11-28 ENCOUNTER — TRANSCRIPTION ENCOUNTER (OUTPATIENT)
Age: 63
End: 2023-11-28

## 2023-12-15 RX ORDER — BLOOD-GLUCOSE SENSOR
EACH MISCELLANEOUS
Qty: 9 | Refills: 2 | Status: ACTIVE | COMMUNITY
Start: 2023-11-27 | End: 1900-01-01

## 2024-01-31 ENCOUNTER — RX RENEWAL (OUTPATIENT)
Age: 64
End: 2024-01-31

## 2024-03-14 ENCOUNTER — APPOINTMENT (OUTPATIENT)
Dept: ENDOCRINOLOGY | Facility: CLINIC | Age: 64
End: 2024-03-14
Payer: COMMERCIAL

## 2024-03-14 VITALS
OXYGEN SATURATION: 98 % | WEIGHT: 263 LBS | HEART RATE: 82 BPM | DIASTOLIC BLOOD PRESSURE: 80 MMHG | BODY MASS INDEX: 39.86 KG/M2 | HEIGHT: 68 IN | SYSTOLIC BLOOD PRESSURE: 130 MMHG

## 2024-03-14 DIAGNOSIS — N52.9 MALE ERECTILE DYSFUNCTION, UNSPECIFIED: ICD-10-CM

## 2024-03-14 DIAGNOSIS — E88.819 INSULIN RESISTANCE, UNSPECIFIED: ICD-10-CM

## 2024-03-14 DIAGNOSIS — E66.9 OBESITY, UNSPECIFIED: ICD-10-CM

## 2024-03-14 DIAGNOSIS — E10.65 TYPE 1 DIABETES MELLITUS WITH HYPERGLYCEMIA: ICD-10-CM

## 2024-03-14 DIAGNOSIS — I10 ESSENTIAL (PRIMARY) HYPERTENSION: ICD-10-CM

## 2024-03-14 DIAGNOSIS — E78.5 HYPERLIPIDEMIA, UNSPECIFIED: ICD-10-CM

## 2024-03-14 PROCEDURE — 99214 OFFICE O/P EST MOD 30 MIN: CPT

## 2024-03-14 RX ORDER — PEN NEEDLE, DIABETIC 32 GX 1/4"
32G X 6 MM NEEDLE, DISPOSABLE MISCELLANEOUS
Qty: 300 | Refills: 3 | Status: ACTIVE | COMMUNITY
Start: 2024-01-31 | End: 1900-01-01

## 2024-03-14 RX ORDER — TIRZEPATIDE 5 MG/.5ML
5 INJECTION, SOLUTION SUBCUTANEOUS
Qty: 3 | Refills: 1 | Status: DISCONTINUED | COMMUNITY
Start: 2023-11-27 | End: 2024-03-14

## 2024-03-14 RX ORDER — PEN NEEDLE, DIABETIC 30 GX3/16"
30G X 8 MM NEEDLE, DISPOSABLE MISCELLANEOUS
Qty: 3 | Refills: 0 | Status: DISCONTINUED | COMMUNITY
Start: 2023-11-27 | End: 2024-03-14

## 2024-03-14 RX ORDER — LANCETS 33 GAUGE
EACH MISCELLANEOUS
Qty: 6 | Refills: 1 | Status: ACTIVE | COMMUNITY
Start: 2024-03-14 | End: 1900-01-01

## 2024-03-14 RX ORDER — METFORMIN HYDROCHLORIDE 500 MG/1
500 TABLET, COATED ORAL
Qty: 180 | Refills: 1 | Status: ACTIVE | COMMUNITY
Start: 2023-11-27 | End: 1900-01-01

## 2024-03-14 RX ORDER — INSULIN LISPRO-AABC 100 [IU]/ML
100 INJECTION, SOLUTION SUBCUTANEOUS
Qty: 2 | Refills: 1 | Status: ACTIVE | COMMUNITY
Start: 1900-01-01 | End: 1900-01-01

## 2024-03-14 RX ORDER — INSULIN GLARGINE 100 [IU]/ML
100 INJECTION, SOLUTION SUBCUTANEOUS
Qty: 8 | Refills: 1 | Status: ACTIVE | COMMUNITY
Start: 2023-11-27 | End: 1900-01-01

## 2024-03-14 RX ORDER — TIRZEPATIDE 7.5 MG/.5ML
7.5 INJECTION, SOLUTION SUBCUTANEOUS
Qty: 3 | Refills: 1 | Status: ACTIVE | COMMUNITY
Start: 2024-03-14 | End: 1900-01-01

## 2024-03-14 RX ORDER — INSULIN LISPRO 100 [IU]/ML
(75-25) 100 INJECTION, SUSPENSION SUBCUTANEOUS
Refills: 0 | Status: DISCONTINUED | COMMUNITY
End: 2024-03-14

## 2024-03-14 RX ORDER — BLOOD SUGAR DIAGNOSTIC
STRIP MISCELLANEOUS
Qty: 6 | Refills: 1 | Status: ACTIVE | COMMUNITY
Start: 2023-11-28 | End: 1900-01-01

## 2024-03-14 NOTE — HISTORY OF PRESENT ILLNESS
[FreeTextEntry1] : Mr. GOOD COSBY  Is  a 63 year  old male  who presented for evaluation of type 1 Diabetes:    Diagnosis: at the age of 19  Current Regimen:  Lantus 60 units BID, Lyumjev 10 units TIDAC ( less often need it ) , Mounjaro 5 mg Q week , metformin 500 mg BID  Previous regimens: was using Tresiba had throat reaction and swelling,  Compliance: ok  SMBG/CGM :  checks 3-4 times/ day  Hypoglycemia: yes  Polyuria/polydipsia : no   Weight change/BMI: lost few lbs  Diet: trying to watch works at night  Exercise: no  HBa1c trend: 5.9%( 10/2023) ...7.4%( 2/2024)   .prevention   Statin: Nexletol 180 mg, ezetimibe 10 mg daily ( tried statin and had SE myalgia )  ACE/ARB :benecar  Eye examination: DR Slater, well followed  Neuropathy: no  ERNESTINA: 3/2024 negative

## 2024-03-14 NOTE — DATA REVIEWED
[FreeTextEntry1] : 1/2023: A1c 8% ACR 3  10/2023: A1c 5.9%  glucose 156 crea 0.92  GFR 94  tg 212 LDL 97  hb12.6   TSH 4.09  Ft4 0.9 total test 639  PSa0.5  2/2024:   A1c 7.4% ACR   LDL 48  Tg 109 GFR 97  crea 0.09  TSH 2.46  ft4 1  Tpo

## 2024-03-14 NOTE — PHYSICAL EXAM
[Alert] : alert [Obese] : obese [No Acute Distress] : no acute distress [No Proptosis] : no proptosis [No Lid Lag] : no lid lag [No Respiratory Distress] : no respiratory distress [Thyroid Not Enlarged] : the thyroid was not enlarged [No Accessory Muscle Use] : no accessory muscle use [Clear to Auscultation] : lungs were clear to auscultation bilaterally [Normal S1, S2] : normal S1 and S2 [No Murmurs] : no murmurs [No Edema] : no peripheral edema [Regular Rhythm] : with a regular rhythm [No Stigmata of Cushings Syndrome] : no stigmata of Cushings Syndrome [No Tremors] : no tremors [Oriented x3] : oriented to person, place, and time [Abdominal Striae] : no abdominal striae [Acanthosis Nigricans] : no acanthosis nigricans

## 2024-03-14 NOTE — ASSESSMENT
[FreeTextEntry1] : Mr. GOOD COSBY  Is  a 63 year  old male  who presented for follow up evaluation of type 1 Diabetes:   # type 1 DM , hypoglycemia, features of insulin resistance obesity  - A1c 7.4%(2/2024) on  Lantus 60 units BID, Lyumjev 10 units TIDAC ( less often need it ) , Mounjaro 5 mg Q week , metformin 500 mg BID - checks FS  3-4 times/ day , will benefit from CGM also concern for hypoglycemia  - send dexcom G7 did not get it yet  -- lower Lantus to 40 units daily at bedtime , had allergy to tresiba  - increase Mounjaro to 7.55 mg Q week  -continue metformin 500 mg BID  - lyumjev 5 units TIDAC if needed, aiming to stop if possible  - monitor FS and will adjust accordingly  - continue  Nexletol 180 mg, ezetimibe 10 mg daily ( tried statin and had SE myalgia )  - on Benicar check ACR  Eye examination: DR Slater, well followed    - ED : check testosterone level

## 2024-08-20 ENCOUNTER — APPOINTMENT (OUTPATIENT)
Dept: ENDOCRINOLOGY | Facility: CLINIC | Age: 64
End: 2024-08-20
Payer: COMMERCIAL

## 2024-08-20 VITALS
SYSTOLIC BLOOD PRESSURE: 130 MMHG | HEART RATE: 86 BPM | WEIGHT: 250 LBS | DIASTOLIC BLOOD PRESSURE: 80 MMHG | HEIGHT: 68 IN | OXYGEN SATURATION: 98 % | BODY MASS INDEX: 37.89 KG/M2

## 2024-08-20 DIAGNOSIS — E88.819 INSULIN RESISTANCE, UNSPECIFIED: ICD-10-CM

## 2024-08-20 DIAGNOSIS — E78.5 HYPERLIPIDEMIA, UNSPECIFIED: ICD-10-CM

## 2024-08-20 DIAGNOSIS — E66.9 OBESITY, UNSPECIFIED: ICD-10-CM

## 2024-08-20 DIAGNOSIS — E10.65 TYPE 1 DIABETES MELLITUS WITH HYPERGLYCEMIA: ICD-10-CM

## 2024-08-20 PROCEDURE — 99213 OFFICE O/P EST LOW 20 MIN: CPT

## 2024-08-20 RX ORDER — TIRZEPATIDE 10 MG/.5ML
10 INJECTION, SOLUTION SUBCUTANEOUS
Qty: 3 | Refills: 1 | Status: ACTIVE | COMMUNITY
Start: 2024-08-20 | End: 1900-01-01

## 2024-08-20 NOTE — ASSESSMENT
[FreeTextEntry1] : Mr. GOOD COSBY  Is  a 64 year  old male  who presented for follow up evaluation of type 1 Diabetes:   # type 1 DM , hypoglycemia, features of insulin resistance obesity  - A1c 7.9%(8/2024) on  Lantus 40 units BID, Lyumjev 10 units TIDAC ( less often need it ) , Mounjaro 7.5 mg Q week , metformin 500 mg BID - checks FS  3-4 times/ day , will benefit from CGM did not get dexcom  -- lower Lantus to 40 units daily at bedtime , had allergy to tresiba , if hypoglycemia  - increase Mounjaro to 10 mg Q week  -continue metformin 500 mg BID  - lyumjev 5 units TIDAC if needed, aiming to stop if possible  - monitor FS and will adjust accordingly  - continue  Nexletol 180 mg, ezetimibe 10 mg daily ( tried statin and had SE myalgia )  - on Benicar ACr negative  Eye examination: DR Slater, well followed    - ED : testosterone level ok

## 2024-08-20 NOTE — PHYSICAL EXAM
[Alert] : alert [Obese] : obese [No Acute Distress] : no acute distress [No Proptosis] : no proptosis [No Lid Lag] : no lid lag [No Respiratory Distress] : no respiratory distress [Thyroid Not Enlarged] : the thyroid was not enlarged [No Accessory Muscle Use] : no accessory muscle use [Clear to Auscultation] : lungs were clear to auscultation bilaterally [Normal S1, S2] : normal S1 and S2 [No Murmurs] : no murmurs [Regular Rhythm] : with a regular rhythm [No Edema] : no peripheral edema [No Stigmata of Cushings Syndrome] : no stigmata of Cushings Syndrome [No Tremors] : no tremors [Oriented x3] : oriented to person, place, and time [Abdominal Striae] : no abdominal striae [Acanthosis Nigricans] : no acanthosis nigricans

## 2024-08-20 NOTE — HISTORY OF PRESENT ILLNESS
[FreeTextEntry1] : Mr. GOOD COSBY  Is  a 64 year  old male  who presented for evaluation of type 1 Diabetes:    Diagnosis: at the age of 19  Current Regimen:  Lantus 40 units BID, Lyumjev 0-5 units TIDAC ( less often need it ) , Mounjaro 7.5 mg Q week , metformin 500 mg BID  Previous regimens: was using Tresiba had throat reaction and swelling,  Compliance: ok  SMBG/CGM :  checks 3-4 times/ day  Hypoglycemia: yes  Polyuria/polydipsia : no   Weight change/BMI: lost few lbs  Diet: trying to watch works at night  Exercise: no  HBa1c trend: 5.9%( 10/2023) ...7.4%( 2/2024)...7.9% ( 7/2024)   .prevention   Statin: Nexletol 180 mg, ezetimibe 10 mg daily ( tried statin and had SE myalgia )  ACE/ARB :benecar  Eye examination: DR Slater, well followed  Neuropathy: no  ERNESTINA: 3/2024 negative    no fever and no chills.

## 2024-08-20 NOTE — DATA REVIEWED
[FreeTextEntry1] : 1/2023: A1c 8% ACR 3  10/2023: A1c 5.9%  glucose 156 crea 0.92  GFR 94  tg 212 LDL 97  hb12.6   TSH 4.09  Ft4 0.9 total test 639  PSa0.5  2/2024:   A1c 7.4% ACR   LDL 48  Tg 109 GFR 97  crea 0.09  TSH 2.46  ft4 1  Tpo  8/2024: A1c 7.9%  ACr negative LDL 62 Tg 80 glucose 60 crea 0.99  GFr 86 TSh 2.4 total test 480  free testosterone 49  b12 492

## 2024-09-30 ENCOUNTER — RX RENEWAL (OUTPATIENT)
Age: 64
End: 2024-09-30

## 2024-12-02 ENCOUNTER — APPOINTMENT (OUTPATIENT)
Dept: ENDOCRINOLOGY | Facility: CLINIC | Age: 64
End: 2024-12-02
Payer: COMMERCIAL

## 2024-12-02 VITALS
WEIGHT: 245 LBS | DIASTOLIC BLOOD PRESSURE: 70 MMHG | HEIGHT: 68 IN | SYSTOLIC BLOOD PRESSURE: 130 MMHG | OXYGEN SATURATION: 98 % | HEART RATE: 80 BPM | BODY MASS INDEX: 37.13 KG/M2

## 2024-12-02 DIAGNOSIS — E78.5 HYPERLIPIDEMIA, UNSPECIFIED: ICD-10-CM

## 2024-12-02 DIAGNOSIS — Z79.4 TYPE 2 DIABETES MELLITUS W/OUT COMPLICATIONS: ICD-10-CM

## 2024-12-02 DIAGNOSIS — E66.9 OBESITY, UNSPECIFIED: ICD-10-CM

## 2024-12-02 DIAGNOSIS — E10.65 TYPE 1 DIABETES MELLITUS WITH HYPERGLYCEMIA: ICD-10-CM

## 2024-12-02 DIAGNOSIS — E11.9 TYPE 2 DIABETES MELLITUS W/OUT COMPLICATIONS: ICD-10-CM

## 2024-12-02 DIAGNOSIS — E88.819 INSULIN RESISTANCE, UNSPECIFIED: ICD-10-CM

## 2024-12-02 DIAGNOSIS — N40.0 BENIGN PROSTATIC HYPERPLASIA WITHOUT LOWER URINARY TRACT SYMPMS: ICD-10-CM

## 2024-12-02 PROCEDURE — 99213 OFFICE O/P EST LOW 20 MIN: CPT

## 2024-12-06 ENCOUNTER — RX RENEWAL (OUTPATIENT)
Age: 64
End: 2024-12-06

## 2025-03-28 RX ORDER — TIRZEPATIDE 10 MG/.5ML
10 INJECTION, SOLUTION SUBCUTANEOUS
Qty: 3 | Refills: 1 | Status: ACTIVE | COMMUNITY
Start: 2025-03-28 | End: 1900-01-01

## 2025-06-05 ENCOUNTER — NON-APPOINTMENT (OUTPATIENT)
Age: 65
End: 2025-06-05

## 2025-06-05 ENCOUNTER — APPOINTMENT (OUTPATIENT)
Dept: ENDOCRINOLOGY | Facility: CLINIC | Age: 65
End: 2025-06-05

## 2025-06-05 VITALS
OXYGEN SATURATION: 96 % | HEIGHT: 68 IN | WEIGHT: 244 LBS | HEART RATE: 78 BPM | DIASTOLIC BLOOD PRESSURE: 58 MMHG | SYSTOLIC BLOOD PRESSURE: 120 MMHG | BODY MASS INDEX: 36.98 KG/M2

## 2025-06-05 DIAGNOSIS — E66.9 OBESITY, UNSPECIFIED: ICD-10-CM

## 2025-06-05 DIAGNOSIS — E11.9 TYPE 2 DIABETES MELLITUS W/OUT COMPLICATIONS: ICD-10-CM

## 2025-06-05 DIAGNOSIS — E10.65 TYPE 1 DIABETES MELLITUS WITH HYPERGLYCEMIA: ICD-10-CM

## 2025-06-05 DIAGNOSIS — Z79.4 TYPE 2 DIABETES MELLITUS W/OUT COMPLICATIONS: ICD-10-CM

## 2025-06-05 PROCEDURE — 99213 OFFICE O/P EST LOW 20 MIN: CPT

## 2025-06-05 RX ORDER — TAMSULOSIN HYDROCHLORIDE 0.4 MG/1
0.4 CAPSULE ORAL
Refills: 0 | Status: ACTIVE | COMMUNITY

## 2025-06-05 RX ORDER — VARDENAFIL 20 MG/1
20 TABLET, FILM COATED ORAL
Refills: 0 | Status: ACTIVE | COMMUNITY

## 2025-07-23 DIAGNOSIS — E10.649 TYPE 1 DIABETES MELLITUS WITH HYPOGLYCEMIA W/OUT COMA: ICD-10-CM

## 2025-07-23 RX ORDER — BLOOD-GLUCOSE METER
W/DEVICE EACH MISCELLANEOUS
Qty: 1 | Refills: 0 | Status: ACTIVE | COMMUNITY
Start: 2025-07-23 | End: 1900-01-01

## 2025-07-23 RX ORDER — LANCETS 33 GAUGE
EACH MISCELLANEOUS
Qty: 1 | Refills: 3 | Status: ACTIVE | COMMUNITY
Start: 2025-07-23 | End: 1900-01-01

## 2025-07-23 RX ORDER — BLOOD SUGAR DIAGNOSTIC
STRIP MISCELLANEOUS
Qty: 270 | Refills: 3 | Status: ACTIVE | COMMUNITY
Start: 2025-07-23 | End: 1900-01-01

## 2025-07-23 RX ORDER — GLUCAGON 3 MG/1
3 POWDER NASAL
Qty: 2 | Refills: 3 | Status: ACTIVE | COMMUNITY
Start: 2025-07-23 | End: 1900-01-01

## 2025-09-06 RX ORDER — GLUCAGON INJECTION, SOLUTION 1 MG/.2ML
1 INJECTION, SOLUTION SUBCUTANEOUS
Qty: 1 | Refills: 5 | Status: ACTIVE | COMMUNITY
Start: 2025-09-06 | End: 1900-01-01